# Patient Record
Sex: MALE | Race: WHITE | Employment: OTHER | URBAN - METROPOLITAN AREA
[De-identification: names, ages, dates, MRNs, and addresses within clinical notes are randomized per-mention and may not be internally consistent; named-entity substitution may affect disease eponyms.]

---

## 2018-08-10 ENCOUNTER — APPOINTMENT (OUTPATIENT)
Dept: CT IMAGING | Age: 83
End: 2018-08-10
Payer: MEDICARE

## 2018-08-10 ENCOUNTER — APPOINTMENT (OUTPATIENT)
Dept: GENERAL RADIOLOGY | Age: 83
End: 2018-08-10
Payer: MEDICARE

## 2018-08-10 ENCOUNTER — HOSPITAL ENCOUNTER (EMERGENCY)
Age: 83
Discharge: HOME | End: 2018-08-10
Attending: EMERGENCY MEDICINE
Payer: MEDICARE

## 2018-08-10 VITALS
TEMPERATURE: 98 F | RESPIRATION RATE: 18 BRPM | OXYGEN SATURATION: 96 % | SYSTOLIC BLOOD PRESSURE: 155 MMHG | HEART RATE: 88 BPM | DIASTOLIC BLOOD PRESSURE: 98 MMHG

## 2018-08-10 DIAGNOSIS — S51.012A SKIN TEAR OF LEFT ELBOW WITHOUT COMPLICATION, INITIAL ENCOUNTER: ICD-10-CM

## 2018-08-10 DIAGNOSIS — Z87.01 HISTORY OF BACTERIAL PNEUMONIA: ICD-10-CM

## 2018-08-10 DIAGNOSIS — M25.522 LEFT ELBOW PAIN: ICD-10-CM

## 2018-08-10 DIAGNOSIS — S01.01XA LACERATION OF SCALP, INITIAL ENCOUNTER: ICD-10-CM
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
## 2018-08-10 DIAGNOSIS — S09.90XA INJURY OF HEAD, INITIAL ENCOUNTER: Primary | ICD-10-CM

## 2018-08-10 DIAGNOSIS — R29.6 FREQUENT FALLS: ICD-10-CM

## 2018-08-10 PROCEDURE — 71045 X-RAY EXAM CHEST 1 VIEW: CPT

## 2018-08-10 PROCEDURE — 73080 X-RAY EXAM OF ELBOW: CPT

## 2018-08-10 PROCEDURE — 72125 CT NECK SPINE W/O DYE: CPT

## 2018-08-10 PROCEDURE — 4500000024 HC ED LEVEL 4 PROCEDURE

## 2018-08-10 PROCEDURE — 99284 EMERGENCY DEPT VISIT MOD MDM: CPT

## 2018-08-10 PROCEDURE — 70450 CT HEAD/BRAIN W/O DYE: CPT

## 2018-08-10 RX ORDER — HEPARIN SODIUM 5000 [USP'U]/ML
5000 INJECTION, SOLUTION INTRAVENOUS; SUBCUTANEOUS EVERY 8 HOURS
COMMUNITY

## 2018-08-10 RX ORDER — ACETAMINOPHEN 325 MG/1
650 TABLET ORAL EVERY 6 HOURS PRN
COMMUNITY

## 2018-08-10 RX ORDER — POLYETHYLENE GLYCOL 3350 17 G/17G
17 POWDER, FOR SOLUTION ORAL DAILY PRN
COMMUNITY

## 2018-08-10 RX ORDER — CETIRIZINE HYDROCHLORIDE 10 MG/1
10 TABLET ORAL DAILY
COMMUNITY

## 2018-08-10 RX ORDER — FINASTERIDE 5 MG/1
5 TABLET, FILM COATED ORAL DAILY
COMMUNITY

## 2018-08-10 RX ORDER — ASPIRIN 81 MG/1
81 TABLET, CHEWABLE ORAL DAILY
COMMUNITY

## 2018-08-10 RX ORDER — CEFTRIAXONE 1 G/1
1 INJECTION, POWDER, FOR SOLUTION INTRAMUSCULAR; INTRAVENOUS EVERY 24 HOURS
COMMUNITY
Start: 2018-08-08 | End: 2018-08-13

## 2018-08-10 RX ORDER — ATORVASTATIN CALCIUM 40 MG/1
40 TABLET, FILM COATED ORAL DAILY
COMMUNITY

## 2018-08-10 RX ORDER — POTASSIUM CHLORIDE 750 MG/1
40 CAPSULE, EXTENDED RELEASE ORAL DAILY
COMMUNITY

## 2018-08-10 RX ORDER — FLUTICASONE PROPIONATE 50 MCG
1 SPRAY, SUSPENSION (ML) NASAL DAILY
COMMUNITY

## 2018-08-10 RX ORDER — AMOXICILLIN 250 MG
1 CAPSULE ORAL DAILY
COMMUNITY

## 2018-08-10 RX ORDER — IPRATROPIUM BROMIDE AND ALBUTEROL SULFATE 2.5; .5 MG/3ML; MG/3ML
1 SOLUTION RESPIRATORY (INHALATION) EVERY 6 HOURS
COMMUNITY
Start: 2018-08-07 | End: 2018-08-13

## 2018-08-10 RX ORDER — OMEPRAZOLE 20 MG/1
20 CAPSULE, DELAYED RELEASE ORAL DAILY
COMMUNITY

## 2018-08-10 NOTE — ED NOTES
Cleaned laceration to pt's forehead using a Hibiclens and Saline solution. Scrubbed with gauze 4 by 4 pads. Irrigated wound with 200 mL of Saline. PARUL Major has been notified that the pt is ready for sutures.      Keyon Segundo  08/10/18 [id number]

## 2018-08-10 NOTE — ED NOTES
Bed: 10  Expected date:   Expected time:   Means of arrival:   Comments:  [id number] Akua Soto RN  08/10/18 [id number]

## 2018-08-10 NOTE — ED NOTES
Patient assisted to the wheelchair via tech. Son is driving patient back to rehab facility where they know to help get him out of the car.       Dawson Betts RN  08/10/18 [id number]

## 2018-08-10 NOTE — ED PROVIDER NOTES
 cefTRIAXone (ROCEPHIN) 1 g injection Inject 1 g into the muscle every 24 hours DAILY FOR ELEVATED WBC      vitamin D 1000 units CAPS Take by mouth      Cyanocobalamin (VITAMIN B 12 PO) Take by mouth      ipratropium-albuterol (DUONEB) 0.5-2.5 (3) MG/3ML SOLN nebulizer solution Inhale 1 vial into the lungs every 6 hours FOR CONGESTION      aspirin 81 MG chewable tablet Take 81 mg by mouth daily      cetirizine (ZYRTEC) 10 MG tablet Take 10 mg by mouth daily      omeprazole (PRILOSEC) 20 MG delayed release capsule Take 20 mg by mouth daily      LIDOCAINE, LIDODERM, 5% PATCH AND REMOVAL       Heparin Sodium, Porcine, (HEPARIN, PORCINE,) 5000 UNIT/ML injection Inject 5,000 Units into the skin every 8 hours      acetaminophen (TYLENOL) 325 MG tablet Take 650 mg by mouth every 6 hours as needed for Pain      diclofenac sodium 1 % GEL Apply 3 g topically 3 times daily      polyethyl glycol-propyl glycol 0.4-0.3 % (SYSTANE) 0.4-0.3 % ophthalmic solution 1 drop as needed for Dry Eyes      polyethylene glycol (GLYCOLAX) packet Take 17 g by mouth daily as needed for Constipation      finasteride (PROSCAR) 5 MG tablet Take 5 mg by mouth daily      fluticasone (FLONASE) 50 MCG/ACT nasal spray 1 spray by Nasal route daily      atorvastatin (LIPITOR) 40 MG tablet Take 40 mg by mouth daily      senna-docusate (PERICOLACE) 8.6-50 MG per tablet Take 1 tablet by mouth daily      Petrolatum-Zinc Oxide (PHYTOPLEX Z-GUARD) 57-17 % PSTE Apply topically       No Known Allergies    REVIEW OF SYSTEMS  10 systems reviewed, pertinent positives per HPI otherwise noted to be negative    PHYSICAL EXAM  BP (!) 155/86   Pulse 100   Temp 97.8 °F (36.6 °C) (Oral)   Resp 16   SpO2 95%   GENERAL APPEARANCE: Awake and alert. Cooperative. No acute distress. HEAD: Normocephalic. 4.0 slightly complicated full-thickness laceration noted to left forehead. Bleeding well controlled without signs of foreign bodies.   No underlying felt to be related to overlap. No significant joint effusion is identified. No evidence of acute fracture or dislocation. No evidence of acute fracture. Follow-up examination recommended in 7-10 days if clinically indicated. Ct Head Wo Contrast    Result Date: 8/10/2018  EXAMINATION: CT OF THE HEAD WITHOUT CONTRAST  8/10/2018 4:14 pm TECHNIQUE: CT of the head was performed without the administration of intravenous contrast. Dose modulation, iterative reconstruction, and/or weight based adjustment of the mA/kV was utilized to reduce the radiation dose to as low as reasonably achievable. COMPARISON: None HISTORY: ORDERING SYSTEM PROVIDED HISTORY: fall TECHNOLOGIST PROVIDED HISTORY: Has a \"code stroke\" or \"stroke alert\" been called? ->No Ordering Physician Provided Reason for Exam: fell today-left frontal laceration Acuity: Acute Type of Exam: Initial Relevant Medical/Surgical History: hx-dementia FINDINGS: BRAIN/VENTRICLES: Global cerebral and cerebellar volume loss. No acute intracranial hemorrhage or mass effect. 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 Chronic appearing thalamic and basal ganglia lacunar infarcts. Extensive confluent cerebral white matter disease. ORBITS: Right orbital medial wall fracture, favor nonacute. The globes and intraorbital contents are unremarkable. SINUSES: Previous right mastoidectomy. SOFT TISSUES/SKULL:  Frontal scalp hematoma. Age-indeterminate deformity of the nasal bones. No acute calvarial fracture. No acute intracranial hemorrhage or mass effect. Age-indeterminate right occipital lobe infarct. Chronic appearing basal ganglia/thalamic lacunar infarcts. Age-indeterminate nasal bone fractures.      Ct Cervical Spine Wo Contrast    Result Date: 8/10/2018  EXAMINATION: CT OF THE CERVICAL SPINE WITHOUT CONTRAST 8/10/2018 4:17 pm TECHNIQUE: CT of the cervical spine was performed without the administration of intravenous contrast. Multiplanar reformatted images are provided for review. Dose modulation, iterative reconstruction, and/or weight based adjustment of the mA/kV was utilized to reduce the radiation dose to as low as reasonably achievable. COMPARISON: None. HISTORY: ORDERING SYSTEM PROVIDED HISTORY: fall TECHNOLOGIST PROVIDED HISTORY: Ordering Physician Provided Reason for Exam: fell today-c/o neck pain Acuity: Acute Type of Exam: Initial FINDINGS: BONES/ALIGNMENT: No evidence of fracture or traumatic subluxation DEGENERATIVE CHANGES: Degenerative disc disease C3-C4 through C6-C7. Diffuse facet osteoarthritis resulting in slight degenerative anterolisthesis of C3 SOFT TISSUES: There is no prevertebral soft tissue swelling. No acute abnormality of the cervical spine. Xr Chest Portable    Result Date: 8/10/2018  EXAMINATION: SINGLE XRAY VIEW OF THE CHEST 8/10/2018 4:27 pm COMPARISON: None. HISTORY: ORDERING SYSTEM PROVIDED HISTORY: chest pain, prior rib fxs, recent PNA TECHNOLOGIST PROVIDED HISTORY: Reason for exam:->chest pain, prior rib fxs, recent PNA Ordering Physician Provided Reason for Exam: FELL AT NH, BUT HAS NO LOC, BACK, OR [address] PAIN. HE HAS SMALL LAC OVER LEFT EYE WITH CONTROLLED BLEEDING Acuity: Acute Type of Exam: Initial FINDINGS: Low lung volumes. Heart size within normal limits. Previous median sternotomy. Scattered reticular lung opacities. Mild left basilar consolidation. The costophrenic angles are preserved. No pneumothorax is visible. Severe glenohumeral osteoarthrosis. No acute displaced fracture demonstrated. Nonspecific lung opacities may reflect pulmonary edema or atypical pneumonia. PROCEDURE:  LACERATION REPAIR  Lea Kernt or their surrogate had an opportunity to ask questions, and the risks, benefits, and alternatives were discussed. The wound was prepped and draped to maintain a sterile field. A local anesthetic was used to completely anesthetize the wound. It was copiously irrigated.  It was explored to its depth in a bloodless field with no sign of tendon, nerve, or vascular injury. No foreign bodies were identified. The 4.0 cm wound was closed with 4, 5-0 ethilon, simple interrupted sutures. There were no complications during the procedure. ED COURSE/MDM/DISPOSITION   I have evaluated this patient in collaboration with Dr. Meseret Maldonado. Pain control was not required  here in the emergency department. Triage vital stable. Head CT without evidence for acute intracranial abnormality. Scalp wound was cleansed and close without complication. CT cervical spine, chest x-ray, and left elbow plain films pending. Please refer to Dr. Mckinley Branch documentation regarding ED course, evaluation, treatment, medical decision making, and disposition for this patient.                 Gissell Haynes [place]  08/11/18 [address] Peggy [place]  08/11/18 0941

## 2018-08-11 NOTE — ED PROVIDER NOTES
Emergency Department Provider Note     Location: [address] [place] [address]  ED  8/10/2018    I independently performed a history and physical on [facility]. All diagnostic, treatment, and disposition decisions were made by myself in conjunction with the mid-level provider. Briefly, this is a 80 y.o. male here for fall, hx of frequent falls, recent head injury, today w/ scalp/forehead lac, living at [address] Son states he recently had tetanus for another fall/lac. Pt w/ advanced dementia, poor historian. ED Triage Vitals [08/10/18 1312]   BP Temp Temp Source Pulse Resp SpO2 Height Weight   (!) 141/98 97.8 °F (36.6 °C) Oral 100 16 95 % -- --        Exam showed no acute distress, PERRLA, EOMI, alert, oriented to name, pt responded to most questions w/ nonsensical statements, follows commands, moves all 4 extremities, L forehead/scalp lac 4cm, w/ sutures in place already repaired by PA when I saw him, skin tear 2cm x2cm of L dorsal lateral elbow, and tenderness to L elbow, very hard of hearing , heart RRR, no M/G/R, lungs CTAB, resp. Nonlabored, no abd tenderness, no peritoneal signs/no rebound/no guarding  . Reviewed imaging/labs, L elbow w/o acute process, skin tear unable to repair, tech applied wound care, PA repaired head lac w/ sutures, I assumed care of patient and performed dispo, son told to have sutures removed in 5-7 days, discussed CT/xrays w/ pt and son, CXR findings likely related to his recent PNA, on abx already, we discussed his frequent falls and my concern for his safety, I explained this might continue to happen if pt does not have full time sitter, but son states that isn't an option. Tetanus UTD. For further details of [facility] emergency department encounter, please see PARUL Parson's documentation. Clinical Impression:  1. Injury of head, initial encounter    2. Laceration of scalp, initial encounter    3. Left elbow pain    4.  History of